# Patient Record
Sex: MALE | Race: BLACK OR AFRICAN AMERICAN | Employment: UNEMPLOYED | URBAN - METROPOLITAN AREA
[De-identification: names, ages, dates, MRNs, and addresses within clinical notes are randomized per-mention and may not be internally consistent; named-entity substitution may affect disease eponyms.]

---

## 2024-11-02 ENCOUNTER — HOSPITAL ENCOUNTER (EMERGENCY)
Age: 54
Discharge: HOME | End: 2024-11-02
Attending: STUDENT IN AN ORGANIZED HEALTH CARE EDUCATION/TRAINING PROGRAM

## 2024-11-02 ENCOUNTER — APPOINTMENT (OUTPATIENT)
Dept: GENERAL RADIOLOGY | Age: 54
End: 2024-11-02

## 2024-11-02 VITALS
BODY MASS INDEX: 35.74 KG/M2 | OXYGEN SATURATION: 98 % | RESPIRATION RATE: 16 BRPM | SYSTOLIC BLOOD PRESSURE: 161 MMHG | DIASTOLIC BLOOD PRESSURE: 94 MMHG | HEART RATE: 72 BPM | TEMPERATURE: 97.9 F | HEIGHT: 76 IN | WEIGHT: 293.5 LBS

## 2024-11-02 DIAGNOSIS — L03.115 CELLULITIS OF RIGHT LOWER EXTREMITY: ICD-10-CM

## 2024-11-02 DIAGNOSIS — M79.89 LEG SWELLING: Primary | ICD-10-CM

## 2024-11-02 LAB
ANION GAP SERPL CALCULATED.3IONS-SCNC: 12 MMOL/L (ref 3–16)
BASOPHILS # BLD: 0.1 K/UL (ref 0–0.2)
BASOPHILS NFR BLD: 0.8 %
BUN SERPL-MCNC: 13 MG/DL (ref 7–20)
CALCIUM SERPL-MCNC: 8.8 MG/DL (ref 8.3–10.6)
CHLORIDE SERPL-SCNC: 106 MMOL/L (ref 99–110)
CO2 SERPL-SCNC: 23 MMOL/L (ref 21–32)
CREAT SERPL-MCNC: 0.9 MG/DL (ref 0.9–1.3)
D-DIMER QUANTITATIVE: 1.12 UG/ML FEU (ref 0–0.6)
DEPRECATED RDW RBC AUTO: 15.5 % (ref 12.4–15.4)
EOSINOPHIL # BLD: 0.2 K/UL (ref 0–0.6)
EOSINOPHIL NFR BLD: 2.6 %
GFR SERPLBLD CREATININE-BSD FMLA CKD-EPI: >90 ML/MIN/{1.73_M2}
GLUCOSE SERPL-MCNC: 102 MG/DL (ref 70–99)
HCT VFR BLD AUTO: 39.3 % (ref 40.5–52.5)
HGB BLD-MCNC: 12.8 G/DL (ref 13.5–17.5)
LYMPHOCYTES # BLD: 2.6 K/UL (ref 1–5.1)
LYMPHOCYTES NFR BLD: 32.3 %
MCH RBC QN AUTO: 28.7 PG (ref 26–34)
MCHC RBC AUTO-ENTMCNC: 32.7 G/DL (ref 31–36)
MCV RBC AUTO: 87.9 FL (ref 80–100)
MONOCYTES # BLD: 0.7 K/UL (ref 0–1.3)
MONOCYTES NFR BLD: 8.9 %
NEUTROPHILS # BLD: 4.5 K/UL (ref 1.7–7.7)
NEUTROPHILS NFR BLD: 55.4 %
PLATELET # BLD AUTO: 258 K/UL (ref 135–450)
PMV BLD AUTO: 9.3 FL (ref 5–10.5)
POTASSIUM SERPL-SCNC: 4.2 MMOL/L (ref 3.5–5.1)
RBC # BLD AUTO: 4.48 M/UL (ref 4.2–5.9)
SODIUM SERPL-SCNC: 141 MMOL/L (ref 136–145)
WBC # BLD AUTO: 8.2 K/UL (ref 4–11)

## 2024-11-02 PROCEDURE — 73590 X-RAY EXAM OF LOWER LEG: CPT

## 2024-11-02 PROCEDURE — 2580000003 HC RX 258: Performed by: STUDENT IN AN ORGANIZED HEALTH CARE EDUCATION/TRAINING PROGRAM

## 2024-11-02 PROCEDURE — 80048 BASIC METABOLIC PNL TOTAL CA: CPT

## 2024-11-02 PROCEDURE — 6360000002 HC RX W HCPCS: Performed by: STUDENT IN AN ORGANIZED HEALTH CARE EDUCATION/TRAINING PROGRAM

## 2024-11-02 PROCEDURE — 99284 EMERGENCY DEPT VISIT MOD MDM: CPT

## 2024-11-02 PROCEDURE — 85379 FIBRIN DEGRADATION QUANT: CPT

## 2024-11-02 PROCEDURE — 96365 THER/PROPH/DIAG IV INF INIT: CPT

## 2024-11-02 PROCEDURE — 85025 COMPLETE CBC W/AUTO DIFF WBC: CPT

## 2024-11-02 RX ADMIN — AMPICILLIN AND SULBACTAM 3000 MG: 2; 1 INJECTION, POWDER, FOR SOLUTION INTRAVENOUS at 11:32

## 2024-11-02 ASSESSMENT — PAIN DESCRIPTION - LOCATION: LOCATION: LEG

## 2024-11-02 ASSESSMENT — PAIN DESCRIPTION - ORIENTATION: ORIENTATION: RIGHT

## 2024-11-02 ASSESSMENT — PAIN - FUNCTIONAL ASSESSMENT: PAIN_FUNCTIONAL_ASSESSMENT: 0-10

## 2024-11-02 ASSESSMENT — PAIN DESCRIPTION - DESCRIPTORS: DESCRIPTORS: PRESSURE

## 2024-11-02 ASSESSMENT — PAIN DESCRIPTION - PAIN TYPE: TYPE: ACUTE PAIN

## 2024-11-02 ASSESSMENT — PAIN SCALES - GENERAL: PAINLEVEL_OUTOF10: 7

## 2024-11-02 NOTE — DISCHARGE INSTRUCTIONS
You were evaluated in the emergency department for leg swelling after dog. Assessments and testing completed during your visit were reassuring and at this time there is no indication for further testing, treatment or admission to the hospital. Given this it is appropriate to discharge you from the emergency department. At the time of discharge we discussed the following:    For now we will treat as if you have a skin infection after the dog bite of the leg with antibiotics please take the antibiotics to completion and I expect your condition to improve.  Also please keep the leg wrapped and elevated as able to reduce swelling.  As we are also considering the possibility of a blood clot in the leg please use the referral to obtain a ultrasound of the leg in the next 2 to 3 days if possible. You may call [phone] [place] [address] to schedule your appointment for ultrasound.  At any time if your condition is worsening despite these treatments or you develop new symptoms such as chest pain or shortness of breath please return to the emerge department    Please note that sometimes it is difficult to diagnose a medical condition early in the disease process before the disease is fully manifest. Because of this, should you develop any new or worsening symptoms, you may return at any time to the emergency department for another evaluation. If available you are also recommended to review this visit with your primary care physician or other medical provider in the next 7 days. Thank you for allowing us to care for you today.

## 2024-11-02 NOTE — ED PROVIDER NOTES
[facility] EMERGENCY DEPARTMENT     EMERGENCY DEPARTMENT ENCOUNTER         Pt Name: Nikolai Parada   MRN: [id number]   Birthdate 1970   Date of evaluation: 11/2/2024   Provider: Norberto Aguirre MD   PCP: No primary care provider on file.   Note Started: 11:02 AM EDT 11/2/24       Chief Complaint     Swelling and pain about right lower extremity after dog bite      History of Present Illness     Nikolai Parada is a 54 y.o. male who presents with pain and swelling about the right lower leg after a dog bite wound about 1 week ago.  The patient has no major concerning past medical history has generally been in baseline health.  He sustained a dermal wound to the right lateral calf about 1 week ago from one of his own animals who is fully immunized.  The patient cared for the wound and it was generally healing well for the first few days however subsequently there spent some developing redness and swelling radiating from the wound and now involving the entirety of the right calf and foot.  The patient remains amatory despite this however given the progression presents for evaluation      Review of Systems     Positives and pertinent negatives as per HPI.    Past Medical, Surgical, Family, and Social History     He has no past medical history on file.  He has no past surgical history on file.  His family history is not on file.  He     SCREENINGS:          [place] Coma Scale  Eye Opening: Spontaneous  Best Verbal Response: Oriented  Best Motor Response: Obeys commands  [place] Coma Scale Score: 15                        CIWA Assessment  BP: (!) 161/94  Pulse: 72               Medications     Discharge Medication List as of 11/2/2024 12:42 PM          Allergies     He has no allergies on file.    Physical Exam     INITIAL VITALS: BP: (!) 161/94, Temp: 97.9 °F (36.6 °C), Pulse: 72, Respirations: 16, SpO2: 98 %     General: alert and conversant in no distress  Skin: warm, dry and pink  Head: normocephalic  atraumatic  Eyes: pupils equal, extra ocular movements intact  Neck: normal range of motion without pain  Chest: clear to auscultation  Heart: regular heart tones; no murmur  noted  Abdomen: soft and non tender   Extremities: warm and well perfused, no significant edema    Additional Exams:  Focused examination of the right lower extremity neurovascular intact at the foot warm and well-perfused cap refill normal.  There is a subcu dermal injury to the right calf from a dog bite there is no fluctuance or discharge.  There is a hemorrhagic crust overall seems to be healing well I have low suspicion for retained foreign body.  There is however some surrounding erythema and the remainder of the lower extremity is warm diffusely tender and swollen    DiagnosticResults     ECG    I have reviewed the ECG as follows:    na      RADIOLOGY:    My independent review of medical imaging is as follows:    See ED course    Final interpretation of all medical imaging per radiology as below:    XR TIBIA FIBULA RIGHT (2 VIEWS)   Final Result      1. Soft tissue swelling.   2. No radiopaque foreign body.      Electronically signed by Boy Holland      Vascular duplex lower extremity venous right    (Results Pending)       LABS:   Results for orders placed or performed during the hospital encounter of 11/02/24   CBC with Auto Differential   Result Value Ref Range    WBC 8.2 4.0 - 11.0 K/uL    RBC 4.48 4.20 - 5.90 M/uL    Hemoglobin 12.8 (L) 13.5 - 17.5 g/dL    Hematocrit 39.3 (L) 40.5 - 52.5 %    MCV 87.9 80.0 - 100.0 fL    MCH 28.7 26.0 - 34.0 pg    MCHC 32.7 31.0 - 36.0 g/dL    RDW 15.5 (H) 12.4 - 15.4 %    Platelets 258 135 - 450 K/uL    MPV 9.3 5.0 - 10.5 fL    Neutrophils % 55.4 %    Lymphocytes % 32.3 %    Monocytes % 8.9 %    Eosinophils % 2.6 %    Basophils % 0.8 %    Neutrophils Absolute 4.5 1.7 - 7.7 K/uL    Lymphocytes Absolute 2.6 1.0 - 5.1 K/uL    Monocytes Absolute 0.7 0.0 - 1.3 K/uL    Eosinophils Absolute 0.2 0.0 -